# Patient Record
Sex: FEMALE | Race: WHITE | NOT HISPANIC OR LATINO | Employment: OTHER | URBAN - METROPOLITAN AREA
[De-identification: names, ages, dates, MRNs, and addresses within clinical notes are randomized per-mention and may not be internally consistent; named-entity substitution may affect disease eponyms.]

---

## 2022-03-08 ENCOUNTER — OFFICE VISIT (OUTPATIENT)
Dept: DERMATOLOGY | Facility: CLINIC | Age: 72
End: 2022-03-08

## 2022-03-08 DIAGNOSIS — Z00.6 RESEARCH SUBJECT: Primary | ICD-10-CM

## 2022-03-08 PROCEDURE — 11104 PUNCH BX SKIN SINGLE LESION: CPT

## 2022-03-08 PROCEDURE — 99207 PR NO CHARGE-RESEARCH SERVICE: CPT

## 2022-03-08 NOTE — PROGRESS NOTES
[facility]  Nucleotide Excision Repair (NER) In Human Populations Study   Mar 8, 2022    Study Participant Name: Thania Younger   : 1950    Study Participant: # 50    Study Procedures Performed:  1. Patient consented and consent form (English) signed. Patient provided copy of consent form.   2. Patient provided $75 [employer] as compensation for study participation; W-9 form completed.   3. NER questionnaire completed  4. Two 2 mm punch biopsies performed (see procedure note below)  5. Blood draw performed    Biopsy - Chronically Sun-Exposed Site (A): L dorsal hand  Punch biopsy:  After discussion of benefits and risks including but not limited to bleeding/bruising, pain/swelling, infection, scar, incomplete removal, nerve damage/numbness, recurrence, and non-diagnostic biopsy, written consent, verbal consent and photographs were obtained. Time-out was performed. The area was cleaned with isopropyl alcohol. 0.5mL of 1% lidocaine with epinephrine was injected to obtain adequate anesthesia of the lesion. A 2 mm punch biopsy was performed.  5-0 fast absorbing gut sutures were utilized to approximate the epidermal edges.  White petroleum jelly/VaselineTM and a bandage was applied to the wound.  Explicit verbal and written wound care instructions were provided.  The patient left the Dermatology Clinic in good condition.     Biopsy - Unexposed Site (B): L proximal inner arm  Punch biopsy:  After discussion of benefits and risks including but not limited to bleeding/bruising, pain/swelling, infection, scar, incomplete removal, nerve damage/numbness, recurrence, and non-diagnostic biopsy, written consent, verbal consent and photographs were obtained. Time-out was performed. The area was cleaned with isopropyl alcohol. 0.5mL of 1% lidocaine with epinephrine was injected to obtain adequate anesthesia of the lesion. A 2 mm punch biopsy was performed.  5-0 fast absorbing gut sutures were utilized to  approximate the epidermal edges.  White petroleum jelly/VaselineTM and a bandage was applied to the wound.  Explicit verbal and written wound care instructions were provided.  The patient left the Dermatology Clinic in good condition.    Staff Involved:  Justin Key MD - Research Fellow  Young Mcnair MD -     Young Mcnair MD  Pronouns: he/him/his    Department of Dermatology  [facility]: Phone: [phone], Fax:[phone]  [facility]: Phone: [phone] Fax: [phone]